# Patient Record
Sex: FEMALE
[De-identification: names, ages, dates, MRNs, and addresses within clinical notes are randomized per-mention and may not be internally consistent; named-entity substitution may affect disease eponyms.]

---

## 2019-10-18 ENCOUNTER — HOSPITAL ENCOUNTER (OUTPATIENT)
Dept: HOSPITAL 53 - M SMT | Age: 24
End: 2019-10-18
Attending: ADVANCED PRACTICE MIDWIFE
Payer: COMMERCIAL

## 2019-10-18 DIAGNOSIS — Z3A.00: ICD-10-CM

## 2019-10-18 DIAGNOSIS — Z34.81: Primary | ICD-10-CM

## 2019-10-18 LAB
BASOPHILS # BLD AUTO: 0 10^3/UL (ref 0–0.2)
BASOPHILS NFR BLD AUTO: 0.3 % (ref 0–1)
CHLAMYDIA DNA AMPLIFICATION: NEGATIVE
EOSINOPHIL # BLD AUTO: 0.1 10^3/UL (ref 0–0.5)
EOSINOPHIL NFR BLD AUTO: 1.2 % (ref 0–3)
HCT VFR BLD AUTO: 38.2 % (ref 36–47)
HGB BLD-MCNC: 13.2 G/DL (ref 12–15.5)
LYMPHOCYTES # BLD AUTO: 2.6 10^3/UL (ref 1.5–5)
LYMPHOCYTES NFR BLD AUTO: 26.9 % (ref 24–44)
MCH RBC QN AUTO: 32.6 PG (ref 27–33)
MCHC RBC AUTO-ENTMCNC: 34.6 G/DL (ref 32–36.5)
MCV RBC AUTO: 94.3 FL (ref 80–96)
MONOCYTES # BLD AUTO: 0.9 10^3/UL (ref 0–0.8)
MONOCYTES NFR BLD AUTO: 9.7 % (ref 0–5)
N GONORRHOEA RRNA SPEC QL NAA+PROBE: NEGATIVE
NEUTROPHILS # BLD AUTO: 5.9 10^3/UL (ref 1.5–8.5)
NEUTROPHILS NFR BLD AUTO: 61.4 % (ref 36–66)
PLATELET # BLD AUTO: 364 10^3/UL (ref 150–450)
RBC # BLD AUTO: 4.05 10^6/UL (ref 4–5.4)
WBC # BLD AUTO: 9.5 10^3/UL (ref 4–10)

## 2019-10-21 LAB
HCV AB SER QL: 0.1 INDEX (ref ?–0.8)
HIV 1+2 AB+HIV1 P24 AG SERPL QL IA: NEGATIVE
RUBELLA IGG QUALITATIVE: (no result)

## 2020-01-08 ENCOUNTER — HOSPITAL ENCOUNTER (OUTPATIENT)
Dept: HOSPITAL 53 - M RAD | Age: 25
End: 2020-01-08
Attending: OBSTETRICS & GYNECOLOGY
Payer: COMMERCIAL

## 2020-01-08 DIAGNOSIS — Z34.92: Primary | ICD-10-CM

## 2020-01-08 DIAGNOSIS — Z3A.22: ICD-10-CM

## 2020-01-09 NOTE — REP
Clinical:  Anatomical evaluation.

 

Comparison: 12/12/2019 .

 

Findings:

Examination demonstrates a single live intrauterine pregnancy in breech

presentation.  Fetal motion is identified by technologist.  Placenta is noted

anterior and grade I without evidence for placenta previa or abruption.  Amniotic

fluid volume is normal.  Cervix measures 4.6 cm in length and appears closed.  No

evidence for nuchal cord.

 

Anterior intramural fibroid measures 3.7 x 2.4 x 3.3 cm.

 

Gestational age by LMP 21 weeks 6 days with NIGEL 05/14/2020 .

Gestational age by current measurements 22 weeks 3 days with NIGEL 05/10/2020 .

 

FHR equals 149 beats per minute.

Estimated fetal weight 507 grams ( 65th percentile).

 

Anatomical assessment demonstrates normal structures including cranium, choroid

plexus, cavum, cerebellum/posterior fossa, facial features, lungs, four-chamber

heart/ventricular outflow tracts, diaphragm, stomach, cord insertion/three-vessel

cord, kidneys/bladder, spine, and extremities.

 

Impression:

1.  Single live intrauterine pregnancy in breech presentation demonstrating

appropriate interval growth.  Anatomical assessment is complete and normal.

2.  Anterior intramural fibroid.

 

 

Electronically Signed by

Emanuel Wilson MD 01/09/2020 05:39 A

## 2020-02-14 ENCOUNTER — HOSPITAL ENCOUNTER (OUTPATIENT)
Dept: HOSPITAL 53 - M PLALAB | Age: 25
End: 2020-02-14
Attending: ADVANCED PRACTICE MIDWIFE
Payer: COMMERCIAL

## 2020-02-14 DIAGNOSIS — Z34.82: Primary | ICD-10-CM

## 2020-02-14 DIAGNOSIS — Z3A.00: ICD-10-CM

## 2020-02-14 LAB
HCT VFR BLD AUTO: 38.8 % (ref 36–47)
HGB BLD-MCNC: 13 G/DL (ref 12–15.5)
MCH RBC QN AUTO: 32.6 PG (ref 27–33)
MCHC RBC AUTO-ENTMCNC: 33.5 G/DL (ref 32–36.5)
MCV RBC AUTO: 97.2 FL (ref 80–96)
PLATELET # BLD AUTO: 335 10^3/UL (ref 150–450)
RBC # BLD AUTO: 3.99 10^6/UL (ref 4–5.4)
WBC # BLD AUTO: 8.8 10^3/UL (ref 4–10)

## 2020-02-18 ENCOUNTER — HOSPITAL ENCOUNTER (OUTPATIENT)
Dept: HOSPITAL 53 - M PLALAB | Age: 25
End: 2020-02-18
Attending: ADVANCED PRACTICE MIDWIFE
Payer: COMMERCIAL

## 2020-02-18 DIAGNOSIS — Z3A.00: ICD-10-CM

## 2020-02-18 DIAGNOSIS — Z34.82: Primary | ICD-10-CM

## 2020-04-16 ENCOUNTER — HOSPITAL ENCOUNTER (OUTPATIENT)
Dept: HOSPITAL 53 - M PLALAB | Age: 25
End: 2020-04-16
Attending: ADVANCED PRACTICE MIDWIFE
Payer: COMMERCIAL

## 2020-04-16 DIAGNOSIS — Z3A.36: Primary | ICD-10-CM

## 2020-04-27 ENCOUNTER — HOSPITAL ENCOUNTER (INPATIENT)
Dept: HOSPITAL 53 - M LDO | Age: 25
LOS: 1 days | Discharge: HOME | End: 2020-04-28
Attending: ADVANCED PRACTICE MIDWIFE | Admitting: ADVANCED PRACTICE MIDWIFE
Payer: COMMERCIAL

## 2020-04-27 VITALS — DIASTOLIC BLOOD PRESSURE: 79 MMHG | SYSTOLIC BLOOD PRESSURE: 123 MMHG

## 2020-04-27 VITALS — DIASTOLIC BLOOD PRESSURE: 74 MMHG | SYSTOLIC BLOOD PRESSURE: 120 MMHG

## 2020-04-27 VITALS — SYSTOLIC BLOOD PRESSURE: 111 MMHG | DIASTOLIC BLOOD PRESSURE: 74 MMHG

## 2020-04-27 VITALS — DIASTOLIC BLOOD PRESSURE: 72 MMHG | SYSTOLIC BLOOD PRESSURE: 115 MMHG

## 2020-04-27 VITALS — HEIGHT: 63 IN | BODY MASS INDEX: 33.67 KG/M2 | WEIGHT: 190.04 LBS

## 2020-04-27 VITALS — SYSTOLIC BLOOD PRESSURE: 116 MMHG | DIASTOLIC BLOOD PRESSURE: 72 MMHG

## 2020-04-27 VITALS — SYSTOLIC BLOOD PRESSURE: 109 MMHG | DIASTOLIC BLOOD PRESSURE: 69 MMHG

## 2020-04-27 VITALS — SYSTOLIC BLOOD PRESSURE: 115 MMHG | DIASTOLIC BLOOD PRESSURE: 73 MMHG

## 2020-04-27 VITALS — DIASTOLIC BLOOD PRESSURE: 57 MMHG | SYSTOLIC BLOOD PRESSURE: 113 MMHG

## 2020-04-27 VITALS — DIASTOLIC BLOOD PRESSURE: 62 MMHG | SYSTOLIC BLOOD PRESSURE: 120 MMHG

## 2020-04-27 VITALS — SYSTOLIC BLOOD PRESSURE: 126 MMHG | DIASTOLIC BLOOD PRESSURE: 72 MMHG

## 2020-04-27 VITALS — DIASTOLIC BLOOD PRESSURE: 82 MMHG | SYSTOLIC BLOOD PRESSURE: 131 MMHG

## 2020-04-27 VITALS — DIASTOLIC BLOOD PRESSURE: 71 MMHG | SYSTOLIC BLOOD PRESSURE: 115 MMHG

## 2020-04-27 DIAGNOSIS — Z87.59: ICD-10-CM

## 2020-04-27 DIAGNOSIS — Z3A.37: ICD-10-CM

## 2020-04-27 LAB
HCT VFR BLD AUTO: 40.2 % (ref 36–47)
HGB BLD-MCNC: 14 G/DL (ref 12–15.5)
MCH RBC QN AUTO: 33.1 PG (ref 27–33)
MCHC RBC AUTO-ENTMCNC: 34.8 G/DL (ref 32–36.5)
MCV RBC AUTO: 95 FL (ref 80–96)
PLATELET # BLD AUTO: 267 10^3/UL (ref 150–450)
RBC # BLD AUTO: 4.23 10^6/UL (ref 4–5.4)
WBC # BLD AUTO: 13.5 10^3/UL (ref 4–10)

## 2020-04-27 PROCEDURE — 0HQ9XZZ REPAIR PERINEUM SKIN, EXTERNAL APPROACH: ICD-10-PCS | Performed by: ADVANCED PRACTICE MIDWIFE

## 2020-04-27 RX ADMIN — Medication SCH TAB: at 09:57

## 2020-04-27 NOTE — DNPDOC
[facility] Delivery Note


Delivery Note





DATE OF DELIVERY: 2020 at 0716





PREDELIVERY DIAGNOSIS: 37-4/7 weeks' gestation and labor. 





POST DELIVERY DIAGNOSIS: Delivered.





PROCEDURE: Spontaneous vaginal delivery.





MIDWIFE: Alejo Lubin CNM, NATE





ANESTHESIA: none.





ESTIMATED BLOOD LOSS: 500 mL.





FINDINGS: 6 pounds 11 ounces; 3022 grams; male infant, Apgar Score 9/9, 

precipitous labor and delivery.





DELIVERY SUMMARY: Patient is a 25-year-old female who is a  who presented

to L&D in active labor. She started karon at 0430. She was fully dilated 

at 0712 and pushed to a living male in the OZ position with restitution to LOT. 

The anterior shoulder delivered with ease and the corpus immediately followed. 

The baby was placed on the maternal abdomen active and crying. The cord was 

clamped x2 after pulsation ceased and cut by the FOB.  A 3-vessel cord was 

noted. The placenta delivered spontaneously and intact at 0721. Uterine 

hemostasis was achieved via fundal massage, 10 mu of IM Pitocin and 0.2 mg of 

Methergine given IM due to patient not having an IV site placed. A uterine sweep

was done and a few clots were removed from the lower uterine segment. Patient 

tolerated the procedure well. She plans to breastfeed her . All counts of

instruments and sponges are correct. Both mom and baby are in stable condition.











ALEJO LUBIN CNM            2020 09:12

## 2020-04-27 NOTE — HPEPDOC
Obstetrical History & Physical


General


Date of Admission


2020 at 07:14


Primary Care Physician:  ALEJO RODRIGUEZ CNM





History of Present Illness


Patient is a 25-year-old female who is a  at 37.4 weeks gestation with an

NIGEL of 20 based off of her LMP and consistent with her first trimester 

ultrasound. Her pregnancy was complicated by a history of fetal demise of 

mono/mono twins at 26 weeks gestation. She presented to L&D with complaints of 

contractions that started at 0430 this morning. She reports bloody show and 

active fetal movement.


Chief Complaint:  Active Labor


Information Provided By:  Patient


Age:  25


:  2


Term:  0


Pre-term:  1


Abortions:  0


Livin





Prenatal Care


Prenatal Care:  [facility]





Prenatal Dating


Final EDC:  May 14, 2020


Final EDC by:  LMP


EGA at Admission:  37.4





Antepartum Course


Prenatal Diagnos(e)s


fetal demise of  mono/mono twins at 26 weeks gestation


Height (inches):  63


Pre-Pregnancy weight (lbs.):  165


Admission Weight (lbs.):  190


Change in Weight (lbs.):  25





Past Medical History


Past Obstetrical History :  


   Gestation:  26


   Type of Delivery:  Spontaneous Vaginal Del. (2016)


   Sex of Infant:  Male (both males)


   Complications:  Yes (fetal demise)


GYN History:  History of STD


Past Medical History


Medical History


No current medical conditions


Surgical History:  Denies/None





Family History


Significant Family History:  Heart disease, Other (depression)





Social History


Marital Status:  


Family situation:  Spouse/partner home


Psychosocial History:  Anxiety, Depression


* Smoker:  non-smoker


Alcohol:  Denies


Drugs:  denies





Abuse Violence Screening


Have you been hit/kicked/slapp:  No


Have you been sexually assault:  No





Prenatal Imunizations


Tdap status:  current


Influenza Status:  current





Allergies


Coded Allergies:  


     No Known Allergies (Unverified , 20)





Medications


Scheduled


Prenatal Vit,Calc76/Iron/Folic (Pnv 29-1 Tablet) 1 Each Tablet, 1 TAB PO DAILY





Physical Examination


Physical Examination


GENERAL: Alert and oriented times three.


BREAST: .


ABDOMEN: Gravid and non-tender to touch.


FETUS: Is vertex (VTX) by sterile vaginal examination (SVE), fetus is vertex 

(VTX) by Leopold.


HEART RATE: Regular rate and rhythm.


LUNGS: Clear to auscultation (CTA).


EXTREMITIES: No edema. No clonus. Deep tendon reflexes (DTRs) + 2.





Vital Signs/I&O





Vital Signs








  Date Time  Temp Pulse Resp B/P (MAP) Pulse Ox O2 Delivery O2 Flow Rate FiO2


 


20 07:04 97.4 71  131/82 (98)    











Laboratory Data


[facility] LABS


Laboratory Tests 2


20 07:17: Serology Scanned Report Hepatitis B Testing


Urine Culture:  No Growth





Pertinent Laboratoy Data


Blood Type:  A+


RBC Antibody Screen:  Negative


HIV:  Negative


Hepatitis B:  Negative


Hepatitis C:  Negative


Rapid Plasma Reagin:  Nonreactive


Rubella:  Immune


Chlamydia/Gonorrhea:  Negative


Group B Streptococcus:  Negative


Glucose Tolerance Test:  94





Prenatal Diag/Inter Therapy


NIPT low risk normal male





Vaginal Examination


Dilation:  complete


Effacement:  100%


Fetal Presentation:  Cephalic presentation





Fetal Assessment


Fetal Heart Rate (FHR):  140


Variability:  Moderate


Accelerations:  Positive


Decelerations:  None





Tocometer


Frequency:  regular


Multi-drug resistant Organism:  No history of MDRO





Assessment/Plan


Assessment


IUP at 37.4 weeks gestation


GBS negative


Category I FHR tracing


active labor





Plan


Admit to L&D.


OOB ad khari


Diet: regular.


Group B Streptococcus (GBS) negative.


Labs and intravenous (IV) per unit protocol.


Anticipate imminent .











ALEJO RODRIGUEZ CNM            2020 08:57

## 2020-04-28 VITALS — DIASTOLIC BLOOD PRESSURE: 66 MMHG | SYSTOLIC BLOOD PRESSURE: 114 MMHG

## 2020-04-28 RX ADMIN — Medication SCH TAB: at 07:39

## 2020-10-15 ENCOUNTER — HOSPITAL ENCOUNTER (OUTPATIENT)
Dept: HOSPITAL 53 - M PLALAB | Age: 25
End: 2020-10-15
Attending: ADVANCED PRACTICE MIDWIFE
Payer: COMMERCIAL

## 2020-10-15 DIAGNOSIS — Z11.3: Primary | ICD-10-CM

## 2020-10-15 LAB
HCV AB SER QL: 0.1 INDEX (ref ?–0.8)
HIV 1+2 AB+HIV1 P24 AG SERPL QL IA: NEGATIVE

## 2020-10-15 PROCEDURE — 86780 TREPONEMA PALLIDUM: CPT

## 2020-10-15 PROCEDURE — 86803 HEPATITIS C AB TEST: CPT

## 2020-10-15 PROCEDURE — 87389 HIV-1 AG W/HIV-1&-2 AB AG IA: CPT

## 2020-10-15 PROCEDURE — 36415 COLL VENOUS BLD VENIPUNCTURE: CPT

## 2020-10-15 PROCEDURE — 87591 N.GONORRHOEAE DNA AMP PROB: CPT

## 2020-10-15 PROCEDURE — 87491 CHLMYD TRACH DNA AMP PROBE: CPT

## 2020-10-22 LAB
CHLAMYDIA DNA AMPLIFICATION: NEGATIVE
N GONORRHOEA RRNA SPEC QL NAA+PROBE: NEGATIVE

## 2021-04-02 ENCOUNTER — HOSPITAL ENCOUNTER (OUTPATIENT)
Dept: HOSPITAL 53 - M LAB REF | Age: 26
End: 2021-04-02
Attending: PHYSICIAN ASSISTANT
Payer: COMMERCIAL

## 2021-04-02 DIAGNOSIS — J06.9: Primary | ICD-10-CM

## 2021-05-21 ENCOUNTER — HOSPITAL ENCOUNTER (OUTPATIENT)
Dept: HOSPITAL 53 - M LAB | Age: 26
End: 2021-05-21
Attending: PHYSICIAN ASSISTANT
Payer: COMMERCIAL

## 2021-05-21 DIAGNOSIS — R10.11: Primary | ICD-10-CM

## 2021-05-21 LAB
ALBUMIN SERPL BCG-MCNC: 3.6 GM/DL (ref 3.2–5.2)
ALT SERPL W P-5'-P-CCNC: 33 U/L (ref 12–78)
BASOPHILS NFR BLD MANUAL: 1 % (ref 0–1)
BILIRUB SERPL-MCNC: 0.4 MG/DL (ref 0.2–1)
BUN SERPL-MCNC: 11 MG/DL (ref 7–18)
CALCIUM SERPL-MCNC: 9.1 MG/DL (ref 8.5–10.1)
CHLORIDE SERPL-SCNC: 107 MEQ/L (ref 98–107)
CO2 SERPL-SCNC: 27 MEQ/L (ref 21–32)
CREAT SERPL-MCNC: 0.67 MG/DL (ref 0.55–1.3)
EOSINOPHIL NFR BLD MANUAL: 3 % (ref 0–3)
GFR SERPL CREATININE-BSD FRML MDRD: > 60 ML/MIN/{1.73_M2} (ref 60–?)
GLUCOSE SERPL-MCNC: 84 MG/DL (ref 70–100)
HCT VFR BLD AUTO: 39.7 % (ref 36–47)
HETEROPH AB SER QL LA: NEGATIVE
HGB BLD-MCNC: 13.2 G/DL (ref 12–15.5)
LYMPHOCYTES NFR BLD MANUAL: 28 % (ref 16–44)
MCH RBC QN AUTO: 31.6 PG (ref 27–33)
MCHC RBC AUTO-ENTMCNC: 33.2 G/DL (ref 32–36.5)
MCV RBC AUTO: 95 FL (ref 80–96)
MONOCYTES NFR BLD MANUAL: 14 % (ref 0–5)
NEUTROPHILS NFR BLD MANUAL: 51 % (ref 28–66)
PLATELET # BLD AUTO: 365 10^3/UL (ref 150–450)
PLATELET BLD QL SMEAR: NORMAL
POTASSIUM SERPL-SCNC: 3.9 MEQ/L (ref 3.5–5.1)
PROT SERPL-MCNC: 7.5 GM/DL (ref 6.4–8.2)
RBC # BLD AUTO: 4.18 10^6/UL (ref 4–5.4)
RBC MORPH BLD: NORMAL
SODIUM SERPL-SCNC: 140 MEQ/L (ref 136–145)
VARIANT LYMPHS NFR BLD MANUAL: 3 % (ref 0–5)
WBC # BLD AUTO: 11 10^3/UL (ref 4–10)

## 2022-03-01 ENCOUNTER — HOSPITAL ENCOUNTER (OUTPATIENT)
Dept: HOSPITAL 53 - M PLALAB | Age: 27
End: 2022-03-01
Attending: PHYSICIAN ASSISTANT
Payer: COMMERCIAL

## 2022-03-01 DIAGNOSIS — Z13.29: Primary | ICD-10-CM

## 2022-03-01 LAB
25(OH)D3 SERPL-MCNC: 13.5 NG/ML (ref 30–100)
ALBUMIN SERPL BCG-MCNC: 3.7 GM/DL (ref 3.2–5.2)
ALT SERPL W P-5'-P-CCNC: 42 U/L (ref 12–78)
BASOPHILS # BLD AUTO: 0.1 10^3/UL (ref 0–0.2)
BASOPHILS NFR BLD AUTO: 0.7 % (ref 0–1)
BILIRUB SERPL-MCNC: 0.5 MG/DL (ref 0.2–1)
BUN SERPL-MCNC: 8 MG/DL (ref 7–18)
CALCIUM SERPL-MCNC: 8.6 MG/DL (ref 8.5–10.1)
CHLORIDE SERPL-SCNC: 105 MEQ/L (ref 98–107)
CO2 SERPL-SCNC: 27 MEQ/L (ref 21–32)
CREAT SERPL-MCNC: 0.71 MG/DL (ref 0.55–1.3)
EOSINOPHIL # BLD AUTO: 0.2 10^3/UL (ref 0–0.5)
EOSINOPHIL NFR BLD AUTO: 1.8 % (ref 0–3)
FOLATE SERPL-MCNC: 7.6 NG/ML
GFR SERPL CREATININE-BSD FRML MDRD: > 60 ML/MIN/{1.73_M2} (ref 60–?)
GLUCOSE SERPL-MCNC: 82 MG/DL (ref 70–100)
HCT VFR BLD AUTO: 42.1 % (ref 36–47)
HGB BLD-MCNC: 14.6 G/DL (ref 12–15.5)
LYMPHOCYTES # BLD AUTO: 3.6 10^3/UL (ref 1.5–5)
LYMPHOCYTES NFR BLD AUTO: 38.8 % (ref 24–44)
MCH RBC QN AUTO: 31.1 PG (ref 27–33)
MCHC RBC AUTO-ENTMCNC: 34.7 G/DL (ref 32–36.5)
MCV RBC AUTO: 89.8 FL (ref 80–96)
MONOCYTES # BLD AUTO: 0.8 10^3/UL (ref 0–0.8)
MONOCYTES NFR BLD AUTO: 8.4 % (ref 2–8)
NEUTROPHILS # BLD AUTO: 4.6 10^3/UL (ref 1.5–8.5)
NEUTROPHILS NFR BLD AUTO: 50.1 % (ref 36–66)
PLATELET # BLD AUTO: 407 10^3/UL (ref 150–450)
POTASSIUM SERPL-SCNC: 3.9 MEQ/L (ref 3.5–5.1)
PROT SERPL-MCNC: 7.5 GM/DL (ref 6.4–8.2)
RBC # BLD AUTO: 4.69 10^6/UL (ref 4–5.4)
SODIUM SERPL-SCNC: 138 MEQ/L (ref 136–145)
T4 FREE SERPL-MCNC: 0.96 NG/DL (ref 0.76–1.46)
TSH SERPL DL<=0.005 MIU/L-ACNC: 1.78 UIU/ML (ref 0.36–3.74)
VIT B12 SERPL-MCNC: 507 PG/ML
WBC # BLD AUTO: 9.2 10^3/UL (ref 4–10)

## 2022-04-04 ENCOUNTER — HOSPITAL ENCOUNTER (OUTPATIENT)
Dept: HOSPITAL 53 - M LAB REF | Age: 27
End: 2022-04-04
Attending: PHYSICIAN ASSISTANT
Payer: COMMERCIAL

## 2022-04-04 DIAGNOSIS — N39.0: Primary | ICD-10-CM

## 2022-06-16 ENCOUNTER — HOSPITAL ENCOUNTER (OUTPATIENT)
Dept: HOSPITAL 53 - M PLALAB | Age: 27
End: 2022-06-16
Attending: ADVANCED PRACTICE MIDWIFE
Payer: COMMERCIAL

## 2022-06-16 DIAGNOSIS — N92.6: Primary | ICD-10-CM

## 2022-06-16 LAB
B-HCG SERPL QL: NEGATIVE
FSH SERPL-ACNC: 8 MIU/ML
LH SERPL-ACNC: 32.4 MIU/ML
PROLACTIN SERPL-MCNC: 5 NG/ML
T4 FREE SERPL-MCNC: 0.92 NG/DL (ref 0.76–1.46)
TSH SERPL DL<=0.005 MIU/L-ACNC: 2.11 UIU/ML (ref 0.36–3.74)

## 2022-06-17 LAB
TESTOST FREE SERPL-MCNC: 4.6 PG/ML (ref 0–4.2)
TESTOST SERPL-MCNC: 46 NG/DL (ref 13–71)

## 2022-09-23 ENCOUNTER — HOSPITAL ENCOUNTER (OUTPATIENT)
Dept: HOSPITAL 53 - M PLALAB | Age: 27
End: 2022-09-23
Attending: NURSE PRACTITIONER
Payer: COMMERCIAL

## 2022-09-23 DIAGNOSIS — N91.2: Primary | ICD-10-CM

## 2022-10-04 ENCOUNTER — HOSPITAL ENCOUNTER (OUTPATIENT)
Dept: HOSPITAL 53 - M PLALAB | Age: 27
End: 2022-10-04
Attending: NURSE PRACTITIONER
Payer: COMMERCIAL

## 2022-10-04 DIAGNOSIS — N91.2: Primary | ICD-10-CM

## 2022-11-23 ENCOUNTER — HOSPITAL ENCOUNTER (OUTPATIENT)
Dept: HOSPITAL 53 - M PLALAB | Age: 27
End: 2022-11-23
Attending: OBSTETRICS & GYNECOLOGY
Payer: COMMERCIAL

## 2022-11-23 DIAGNOSIS — O09.293: Primary | ICD-10-CM

## 2022-11-23 DIAGNOSIS — Z3A.00: ICD-10-CM

## 2022-11-23 LAB
HCT VFR BLD AUTO: 38.7 % (ref 36–47)
HCV AB SER QL: 0.1 INDEX (ref ?–0.8)
HGB BLD-MCNC: 12.9 G/DL (ref 12–15.5)
HIV 1+2 AB+HIV1 P24 AG SERPL QL IA: NEGATIVE
MCH RBC QN AUTO: 32 PG (ref 27–33)
MCHC RBC AUTO-ENTMCNC: 33.3 G/DL (ref 32–36.5)
MCV RBC AUTO: 96 FL (ref 80–96)
N GONORRHOEA RRNA SPEC QL NAA+PROBE: NEGATIVE
PLATELET # BLD AUTO: 329 10^3/UL (ref 150–450)
RBC # BLD AUTO: 4.03 10^6/UL (ref 4–5.4)
WBC # BLD AUTO: 8.8 10^3/UL (ref 4–10)

## 2023-01-23 ENCOUNTER — HOSPITAL ENCOUNTER (OUTPATIENT)
Dept: HOSPITAL 53 - M WHC | Age: 28
End: 2023-01-23
Attending: ADVANCED PRACTICE MIDWIFE
Payer: COMMERCIAL

## 2023-01-23 DIAGNOSIS — Z34.82: Primary | ICD-10-CM

## 2023-01-23 DIAGNOSIS — Z3A.21: ICD-10-CM

## 2023-03-17 ENCOUNTER — HOSPITAL ENCOUNTER (OUTPATIENT)
Dept: HOSPITAL 53 - M PLALAB | Age: 28
End: 2023-03-17
Attending: OBSTETRICS & GYNECOLOGY
Payer: COMMERCIAL

## 2023-03-17 ENCOUNTER — HOSPITAL ENCOUNTER (OUTPATIENT)
Dept: HOSPITAL 53 - M WHC | Age: 28
End: 2023-03-17
Attending: OBSTETRICS & GYNECOLOGY
Payer: COMMERCIAL

## 2023-03-17 DIAGNOSIS — Z3A.20: ICD-10-CM

## 2023-03-17 DIAGNOSIS — Z34.82: Primary | ICD-10-CM

## 2023-03-17 DIAGNOSIS — O34.12: Primary | ICD-10-CM

## 2023-03-17 LAB
HCT VFR BLD AUTO: 37.6 % (ref 36–47)
HGB BLD-MCNC: 12.5 G/DL (ref 12–15.5)
MCH RBC QN AUTO: 32.1 PG (ref 27–33)
MCHC RBC AUTO-ENTMCNC: 33.2 G/DL (ref 32–36.5)
MCV RBC AUTO: 96.4 FL (ref 80–96)
N GONORRHOEA RRNA SPEC QL NAA+PROBE: NEGATIVE
PLATELET # BLD AUTO: 324 10^3/UL (ref 150–450)
RBC # BLD AUTO: 3.9 10^6/UL (ref 4–5.4)
WBC # BLD AUTO: 9.8 10^3/UL (ref 4–10)

## 2023-04-20 ENCOUNTER — HOSPITAL ENCOUNTER (INPATIENT)
Dept: HOSPITAL 53 - M LDO | Age: 28
LOS: 1 days | Discharge: HOME | End: 2023-04-21
Attending: OBSTETRICS & GYNECOLOGY | Admitting: OBSTETRICS & GYNECOLOGY
Payer: COMMERCIAL

## 2023-04-20 VITALS — WEIGHT: 202.16 LBS | HEIGHT: 63 IN | BODY MASS INDEX: 35.82 KG/M2

## 2023-04-20 VITALS — SYSTOLIC BLOOD PRESSURE: 133 MMHG | DIASTOLIC BLOOD PRESSURE: 69 MMHG

## 2023-04-20 VITALS — DIASTOLIC BLOOD PRESSURE: 79 MMHG | SYSTOLIC BLOOD PRESSURE: 130 MMHG

## 2023-04-20 VITALS — SYSTOLIC BLOOD PRESSURE: 130 MMHG | DIASTOLIC BLOOD PRESSURE: 73 MMHG

## 2023-04-20 VITALS — SYSTOLIC BLOOD PRESSURE: 130 MMHG | DIASTOLIC BLOOD PRESSURE: 78 MMHG

## 2023-04-20 VITALS — DIASTOLIC BLOOD PRESSURE: 77 MMHG | SYSTOLIC BLOOD PRESSURE: 124 MMHG

## 2023-04-20 VITALS — DIASTOLIC BLOOD PRESSURE: 81 MMHG | SYSTOLIC BLOOD PRESSURE: 122 MMHG

## 2023-04-20 VITALS — DIASTOLIC BLOOD PRESSURE: 67 MMHG | SYSTOLIC BLOOD PRESSURE: 126 MMHG

## 2023-04-20 VITALS — SYSTOLIC BLOOD PRESSURE: 123 MMHG | DIASTOLIC BLOOD PRESSURE: 78 MMHG

## 2023-04-20 VITALS — SYSTOLIC BLOOD PRESSURE: 119 MMHG | DIASTOLIC BLOOD PRESSURE: 74 MMHG

## 2023-04-20 VITALS — SYSTOLIC BLOOD PRESSURE: 126 MMHG | DIASTOLIC BLOOD PRESSURE: 80 MMHG

## 2023-04-20 DIAGNOSIS — Z3A.33: ICD-10-CM

## 2023-04-20 LAB
BASE EXCESS BLDCOA CALC-SCNC: -2.9 MMOL/L
BASE EXCESS BLDCOV CALC-SCNC: -1.2 MMOL/L
CO2 BLDCOA CALC-SCNC: 26.8 MEQ/L
CO2 BLDCOV CALC-SCNC: 24.8 MEQ/L
HCO3 STD BLDCOA-SCNC: 20.4 MEQ/L
HCO3 STD BLDCOA-SCNC: 25 MEQ/L
HCO3 STD BLDCOV-SCNC: 22.6 MEQ/L
HCO3 STD BLDCOV-SCNC: 23.5 MEQ/L
HCT VFR BLD AUTO: 39.8 % (ref 36–47)
HGB BLD-MCNC: 13.5 G/DL (ref 12–15.5)
MCH RBC QN AUTO: 32.9 PG (ref 27–33)
MCHC RBC AUTO-ENTMCNC: 33.9 G/DL (ref 32–36.5)
MCV RBC AUTO: 97.1 FL (ref 80–96)
PCO2 BLDCOA: 55.8 MMHG
PCO2 BLDCOV: 39.7 MMHG
PH BLDCOA: 7.27 UNITS
PH BLDCOV: 7.39 UNITS
PLATELET # BLD AUTO: 249 10^3/UL (ref 150–450)
PO2 BLDCOA: 16 MMHG
PO2 BLDCOV: 25 MMHG
RBC # BLD AUTO: 4.1 10^6/UL (ref 4–5.4)
SAO2 % BLDCOA: 30.4 %
SAO2 % BLDCOV: 63.7 %
WBC # BLD AUTO: 9.3 10^3/UL (ref 4–10)

## 2023-04-20 RX ADMIN — ACETAMINOPHEN PRN MG: 500 TABLET ORAL at 20:34

## 2023-04-20 RX ADMIN — SODIUM CHLORIDE, POTASSIUM CHLORIDE, SODIUM LACTATE AND CALCIUM CHLORIDE SCH MLS/HR: 600; 310; 30; 20 INJECTION, SOLUTION INTRAVENOUS at 17:05

## 2023-04-20 RX ADMIN — SODIUM CHLORIDE, POTASSIUM CHLORIDE, SODIUM LACTATE AND CALCIUM CHLORIDE SCH MLS/HR: 600; 310; 30; 20 INJECTION, SOLUTION INTRAVENOUS at 09:35

## 2023-04-21 VITALS — DIASTOLIC BLOOD PRESSURE: 68 MMHG | SYSTOLIC BLOOD PRESSURE: 122 MMHG

## 2023-04-21 RX ADMIN — ACETAMINOPHEN PRN MG: 500 TABLET ORAL at 11:09

## 2024-07-28 ENCOUNTER — HOSPITAL ENCOUNTER (EMERGENCY)
Dept: HOSPITAL 53 - M ED | Age: 29
Discharge: HOME | End: 2024-07-28
Payer: COMMERCIAL

## 2024-07-28 VITALS — HEIGHT: 63 IN | BODY MASS INDEX: 31.96 KG/M2 | WEIGHT: 180.38 LBS

## 2024-07-28 VITALS — SYSTOLIC BLOOD PRESSURE: 118 MMHG | OXYGEN SATURATION: 98 % | DIASTOLIC BLOOD PRESSURE: 76 MMHG

## 2024-07-28 VITALS — TEMPERATURE: 96.9 F

## 2024-07-28 DIAGNOSIS — S92.591A: Primary | ICD-10-CM

## 2024-07-28 DIAGNOSIS — Z79.810: ICD-10-CM

## 2024-07-28 DIAGNOSIS — F32.A: ICD-10-CM
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
## 2024-07-28 DIAGNOSIS — F41.9: ICD-10-CM
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
## 2024-07-28 RX ADMIN — MORPHINE SULFATE ONE MG: 2 INJECTION, SOLUTION INTRAMUSCULAR; INTRAVENOUS at 20:18

## 2024-07-28 RX ADMIN — ACETAMINOPHEN ONE MG: 500 TABLET ORAL at 20:15

## 2025-02-05 ENCOUNTER — HOSPITAL ENCOUNTER (OUTPATIENT)
Dept: HOSPITAL 53 - M PLALAB | Age: 30
End: 2025-02-05
Attending: PHYSICIAN ASSISTANT
Payer: COMMERCIAL

## 2025-02-05 DIAGNOSIS — K62.5: Primary | ICD-10-CM

## 2025-02-05 LAB
ALBUMIN SERPL BCG-MCNC: 3.7 G/DL (ref 3.2–5.2)
ALP SERPL-CCNC: 85 U/L (ref 35–104)
ALT SERPL W P-5'-P-CCNC: 50 U/L (ref 7–40)
AST SERPL-CCNC: 30 U/L (ref ?–34)
BASOPHILS # BLD AUTO: 0.1 10^3/UL (ref 0–0.2)
BASOPHILS NFR BLD AUTO: 0.9 % (ref 0–1)
BILIRUB CONJ SERPL-MCNC: 0.1 MG/DL (ref ?–0.4)
BILIRUB SERPL-MCNC: 0.4 MG/DL (ref 0.3–1.2)
BUN SERPL-MCNC: 11 MG/DL (ref 9–23)
CALCIUM SERPL-MCNC: 9.4 MG/DL (ref 8.5–10.1)
CHLORIDE SERPL-SCNC: 107 MMOL/L (ref 98–107)
CO2 SERPL-SCNC: 25 MMOL/L (ref 20–31)
CREAT SERPL-MCNC: 0.54 MG/DL (ref 0.55–1.3)
EOSINOPHIL # BLD AUTO: 0.2 10^3/UL (ref 0–0.5)
EOSINOPHIL NFR BLD AUTO: 2 % (ref 0–3)
FERRITIN SERPL-MCNC: 44.1 NG/ML (ref 7.3–270.7)
GFR SERPL CREATININE-BSD FRML MDRD: > 60 ML/MIN/{1.73_M2} (ref 60–?)
GLUCOSE SERPL-MCNC: 94 MG/DL (ref 60–100)
HCT VFR BLD AUTO: 43.3 % (ref 36–47)
HGB BLD-MCNC: 14.6 G/DL (ref 12–15.5)
IRON SATN MFR SERPL: 32.3 % (ref 13.2–45)
IRON SERPL-MCNC: 102 UG/DL (ref 50–170)
LYMPHOCYTES # BLD AUTO: 3.1 10^3/UL (ref 1.5–5)
LYMPHOCYTES NFR BLD AUTO: 39.5 % (ref 24–44)
MCH RBC QN AUTO: 31.4 PG (ref 27–33)
MCHC RBC AUTO-ENTMCNC: 33.7 G/DL (ref 32–36.5)
MCV RBC AUTO: 93.1 FL (ref 80–96)
MONOCYTES # BLD AUTO: 0.8 10^3/UL (ref 0–0.8)
MONOCYTES NFR BLD AUTO: 10.2 % (ref 2–8)
NEUTROPHILS # BLD AUTO: 3.7 10^3/UL (ref 1.5–8.5)
NEUTROPHILS NFR BLD AUTO: 47.1 % (ref 36–66)
PLATELET # BLD AUTO: 452 10^3/UL (ref 150–450)
POTASSIUM SERPL-SCNC: 4.4 MMOL/L (ref 3.5–5.1)
PROT SERPL-MCNC: 7.6 G/DL (ref 5.7–8.2)
RBC # BLD AUTO: 4.65 10^6/UL (ref 4–5.4)
SODIUM SERPL-SCNC: 142 MMOL/L (ref 136–145)
TIBC SERPL-MCNC: 316 UG/DL (ref 250–425)
WBC # BLD AUTO: 7.8 10^3/UL (ref 4–10)

## 2025-02-26 ENCOUNTER — HOSPITAL ENCOUNTER (OUTPATIENT)
Dept: HOSPITAL 53 - M OPP | Age: 30
Discharge: HOME | End: 2025-02-26
Attending: SURGERY
Payer: COMMERCIAL

## 2025-02-26 VITALS — TEMPERATURE: 97.2 F

## 2025-02-26 VITALS — WEIGHT: 194.8 LBS | HEIGHT: 63 IN | BODY MASS INDEX: 34.52 KG/M2

## 2025-02-26 VITALS — SYSTOLIC BLOOD PRESSURE: 115 MMHG | DIASTOLIC BLOOD PRESSURE: 65 MMHG | OXYGEN SATURATION: 98 %

## 2025-02-26 DIAGNOSIS — F41.9: ICD-10-CM

## 2025-02-26 DIAGNOSIS — D12.2: Primary | ICD-10-CM

## 2025-02-26 DIAGNOSIS — B96.81: ICD-10-CM

## 2025-02-26 DIAGNOSIS — K29.50: ICD-10-CM

## 2025-02-26 DIAGNOSIS — G43.909: ICD-10-CM

## 2025-02-26 DIAGNOSIS — Z79.899: ICD-10-CM

## 2025-02-26 DIAGNOSIS — F32.A: ICD-10-CM

## 2025-02-26 PROCEDURE — 45385 COLONOSCOPY W/LESION REMOVAL: CPT

## 2025-02-26 PROCEDURE — 43239 EGD BIOPSY SINGLE/MULTIPLE: CPT

## 2025-02-26 PROCEDURE — 88305 TISSUE EXAM BY PATHOLOGIST: CPT
